# Patient Record
Sex: MALE | Race: WHITE | NOT HISPANIC OR LATINO | Employment: UNEMPLOYED | URBAN - METROPOLITAN AREA
[De-identification: names, ages, dates, MRNs, and addresses within clinical notes are randomized per-mention and may not be internally consistent; named-entity substitution may affect disease eponyms.]

---

## 2019-11-20 ENCOUNTER — MEDICAL CORRESPONDENCE (OUTPATIENT)
Dept: HEALTH INFORMATION MANAGEMENT | Facility: CLINIC | Age: 59
End: 2019-11-20

## 2020-01-10 ENCOUNTER — TRANSFERRED RECORDS (OUTPATIENT)
Dept: HEALTH INFORMATION MANAGEMENT | Facility: CLINIC | Age: 60
End: 2020-01-10

## 2020-01-11 ENCOUNTER — TRANSFERRED RECORDS (OUTPATIENT)
Dept: HEALTH INFORMATION MANAGEMENT | Facility: CLINIC | Age: 60
End: 2020-01-11

## 2020-01-12 ENCOUNTER — TRANSFERRED RECORDS (OUTPATIENT)
Dept: HEALTH INFORMATION MANAGEMENT | Facility: CLINIC | Age: 60
End: 2020-01-12

## 2020-02-28 ENCOUNTER — TRANSFERRED RECORDS (OUTPATIENT)
Dept: HEALTH INFORMATION MANAGEMENT | Facility: CLINIC | Age: 60
End: 2020-02-28

## 2020-03-30 ENCOUNTER — PRE VISIT (OUTPATIENT)
Dept: ONCOLOGY | Facility: CLINIC | Age: 60
End: 2020-03-30

## 2020-03-30 NOTE — TELEPHONE ENCOUNTER
ONCOLOGY INTAKE: Records Information      APPT INFORMATION: [id number] Dr. Tristin Coleman  Referring provider:  Self   Referring provider s clinic:  NA  Reason for visit/diagnosis:  Prostate Cancer  Has patient been notified of appointment date and time?: Yes    RECORDS INFORMATION:  Were the records received with the referral (via [employer])? No    Has patient been seen for any external appt for this diagnosis? Yes    If yes, where? [facility]    Has patient had any imaging or procedures outside of [facility] for this condition? Yes      If Yes, where? [facility]    ADDITIONAL INFORMATION:  None

## 2020-03-30 NOTE — TELEPHONE ENCOUNTER
Reason for Visit: Consult    Diagnosis: Prostate cancer    Orders/Procedures/Records: Records available    Contact Patient: N/A    Rooming Requirements: Check pt in      Carol Burkett  03/30/20  11:29 AM

## 2020-04-06 ENCOUNTER — PRE VISIT (OUTPATIENT)
Dept: ONCOLOGY | Facility: CLINIC | Age: 60
End: 2020-04-06

## 2020-04-06 ENCOUNTER — VIRTUAL VISIT (OUTPATIENT)
Dept: UROLOGY | Facility: CLINIC | Age: 60
End: 2020-04-06
Payer: COMMERCIAL

## 2020-04-06 DIAGNOSIS — C61 PROSTATE CANCER (H): Primary | ICD-10-CM

## 2020-04-06 NOTE — PATIENT INSTRUCTIONS
Please complete a PSA. Dr. Coleman will call you with the results. Please schedule an appointment to see Dr. Coleman in 3 months with a PSA prior to appointment.    It was a pleasure meeting with you today.  Thank you for allowing me and my team the privilege of caring for you today.  YOU are the reason we are here, and I truly hope we provided you with the excellent service you deserve.  Please let us know if there is anything else we can do for you so that we can be sure you are leaving completely satisfied with your care experience.        Cory Vizcaino, EMT

## 2020-04-06 NOTE — PROGRESS NOTES
"Viktor Cordon is a 59 year old male who is being evaluated via a billable video visit.      The patient has been notified of following:     \"This video visit will be conducted via a call between you and your physician/provider. We have found that certain health care needs can be provided without the need for an in-person physical exam.  This service lets us provide the care you need with a video conversation.  If a prescription is necessary we can send it directly to your pharmacy.  If lab work is needed we can place an order for that and you can then stop by our lab to have the test done at a later time.    If during the course of the call the physician/provider feels a video visit is not appropriate, you will not be charged for this service.\"     Patient has given verbal consent for Video visit? Yes    Patient would like the video invitation sent by: Text to cell phone: [phone]    Video Start Time: 1:55 PM          Chief Complaint:   Prostate Cancer         History of Present Illness:    Viktor Cordon is a very pleasant 59 year old male who presents with a history of Prostate Cancer. He underwent RALP with lymph node dissection by Dr. Jose M Barlow MD on 1/10/2020. He has had uncomplicated post-op recovery.   Final pathology from prostatectomy was [place] 4+4=8 prostate cancer, stage pT2N0 with positive margins. He presents today to transfer care as he has relocated to the [facility]. No current issues. He reports good return of continence to date. No return of erectile function yet.         Past Medical History:   Prostate Cancer         Past Surgical History:   RALP 1/10/2020         Medications   Multivitamin         Family History:   No known family history of  malignancy.         Social History:     Social History     Socioeconomic History     Marital status: Single     Spouse name: Not on file     Number of children: Not on file     Years of education: Not on file     Highest education level: Not " on file   Occupational History     Not on file   Social Needs     Financial resource strain: Not on file     Food insecurity     Worry: Not on file     Inability: Not on file     Transportation needs     Medical: Not on file     Non-medical: Not on file   Tobacco Use     Smoking status: Former Smoker     Types: Cigarettes     Smokeless tobacco: Former User     Types: Chew   Substance and Sexual Activity     Alcohol use: Not on file     Drug use: Not on file     Sexual activity: Not on file   Lifestyle     Physical activity     Days per week: Not on file     Minutes per session: Not on file     Stress: Not on file   Relationships     Social connections     Talks on phone: Not on file     Gets together: Not on file     Attends [employer] service: Not on file     Active member of club or organization: Not on file     Attends meetings of clubs or organizations: Not on file     Relationship status: Not on file     Intimate partner violence     Fear of current or ex partner: Not on file     Emotionally abused: Not on file     Physically abused: Not on file     Forced sexual activity: Not on file   Other Topics Concern     Not on file   Social History Narrative     Not on file     Works as          Allergies:   Patient has no known allergies.         Review of Systems:  From intake questionnaire     Skin: negative  Eyes: negative  Ears/Nose/Throat: negative  Respiratory: No shortness of breath, dyspnea on exertion, cough, or hemoptysis  Cardiovascular: No chest pain or palpitations  Gastrointestinal: negative; no nausea/vomiting, constipation or diarrhea  Genitourinary: as per HPI  Musculoskeletal: negative  Neurologic: negative  Psychiatric: negative  Hematologic/Lymphatic/Immunologic: negative  Endocrine: negative         Physical Exam:     Patient is a 59 year old  male   Vitals: There were no vitals taken for this visit.  Constitutional: Alert, no acute distress, oriented, conversant  Eyes: no scleral  icterus; extraocular muscles intact, moist conjunctivae  Respiratory: no respiratory distress, or pursed lip breathing  Neuro: Alert, oriented, speech and mentation normal  Psych: affect and mood normal, alert and oriented to person, place and time      Outside and Past Medical records:    I spent 10 minutes reviewing outside and past medical records.         Assessment and Plan:     Assessment: 59 year old male with stage pT2N0, [place] 4+4=8 prostate cancer, s/p RALP completed 1/10/2020 in [place] with Dr. Barlow. Positive margins. Recovering well. We discussed his pathology in detail today. We discussed the importance of ongoing PSA surveillance and risk of recurrence. We discussed possible rationlae for adjuvant/salvage radiotherapy and that we would discuss this further once we know his post-op PSA values. Will plan to obtain a PSA in the next few weeks and plan for the next one in about 3 months. Will call him with result of this next PSA.     Plan:  - PSA soon - will call with results  - Follow-up 3 months with PSA    Orders  Orders Placed This Encounter   Procedures     PSA tumor marker     PSA tumor marker     Video-Visit Details    Type of service:  Video Visit    Video End Time (time video stopped): 2:08    Originating Location (pt. Location): Home    Distant Location (provider location):  [facility] [place] [facility] AND [facility] FOR PROSTATE AND UROLOGIC CANCERS     Mode of Communication:  [facility] Conference [facility]    Tristin Coleman MD  Urology  [facility]

## 2020-05-06 DIAGNOSIS — C61 PROSTATE CANCER (H): ICD-10-CM

## 2020-05-06 LAB — PSA SERPL-MCNC: 0.59 UG/L (ref 0–4)

## 2020-05-06 PROCEDURE — 36415 COLL VENOUS BLD VENIPUNCTURE: CPT | Performed by: UROLOGY

## 2020-05-06 PROCEDURE — 84153 ASSAY OF PSA TOTAL: CPT | Performed by: UROLOGY

## 2020-05-12 ENCOUNTER — TELEPHONE (OUTPATIENT)
Dept: UROLOGY | Facility: CLINIC | Age: 60
End: 2020-05-12

## 2020-05-12 NOTE — TELEPHONE ENCOUNTER
[facility] Call Center    Phone Message    May a detailed message be left on voicemail: yes     Reason for Call: Requesting Results   Name/type of test: PSA  Date of test: 5/6  Was test done at a location other than [facility] (Please fill in the location if not [facility])?: No  Pt would like results please    Action Taken: Message routed to:  [facility]rology    Travel Screening: Not Applicable

## 2020-05-12 NOTE — TELEPHONE ENCOUNTER
Patient that his PSA is 0.59. Patient will follow up with Dr. Tristin Coleman on 7/27/2020 @ 10:15 am.      Escobar Saini MA

## 2020-05-14 ENCOUNTER — VIRTUAL VISIT (OUTPATIENT)
Dept: UROLOGY | Facility: CLINIC | Age: 60
End: 2020-05-14
Payer: COMMERCIAL

## 2020-05-14 VITALS — BODY MASS INDEX: 25.84 KG/M2 | HEIGHT: 73 IN | WEIGHT: 195 LBS

## 2020-05-14 DIAGNOSIS — C61 PROSTATE CANCER (H): Primary | ICD-10-CM

## 2020-05-14 PROCEDURE — 99213 OFFICE O/P EST LOW 20 MIN: CPT | Mod: 95 | Performed by: UROLOGY

## 2020-05-14 ASSESSMENT — PAIN SCALES - GENERAL: PAINLEVEL: NO PAIN (0)

## 2020-05-14 ASSESSMENT — MIFFLIN-ST. JEOR: SCORE: 1758.39

## 2020-05-14 NOTE — PROGRESS NOTES
"Viktor Cordon is a 60 year old male who is being evaluated via a billable video visit.      The patient has been notified of following:     \"This video visit will be conducted via a call between you and your physician/provider. We have found that certain health care needs can be provided without the need for an in-person physical exam.  This service lets us provide the care you need with a video conversation.  If a prescription is necessary we can send it directly to your pharmacy.  If lab work is needed we can place an order for that and you can then stop by our lab to have the test done at a later time.    Video visits are billed at different rates depending on your insurance coverage.  Please reach out to your insurance provider with any questions.    If during the course of the call the physician/provider feels a video visit is not appropriate, you will not be charged for this service.\"    Patient has given verbal consent for Video visit? Yes    How would you like to obtain your AVS? [facility]    Patient would like the video invitation sent by: Text to cell phone:  [phone]    Will anyone else be joining your video visit? No      CHIEF COMPLAINT   It was my pleasure to see Viktor Cordon who is a 60 year old male for follow-up of Prostate Cancer.      HPI  Viktor Cordon is a very pleasant 60 year old male who presents with a history of Prostate Cancer. He underwent RALP with lymph node dissection by Dr. Jose M Barlow MD on 1/10/2020. He has had uncomplicated post-op recovery.   Final pathology from prostatectomy was [place] 4+4=8 prostate cancer, stage pT2N0 with positive margins. He presents today to transfer care as he has relocated to the [facility]. No current issues. He reports good return of continence. Not wearing pads. No return of erectile function yet.    Here to follow-up PSA. This did not go to undetectable post-op.    PSA  5/6/2020 - 0.59         Allergies:   Patient has no known allergies.     "     Review of Systems:  From intake questionnaire     Skin: negative  Eyes: negative  Ears/Nose/Throat: negative  Respiratory: No shortness of breath, dyspnea on exertion, cough, or hemoptysis  Cardiovascular: No chest pain or palpitations  Gastrointestinal: negative; no nausea/vomiting, constipation or diarrhea  Genitourinary: as per HPI  Musculoskeletal: negative  Neurologic: negative  Psychiatric: negative  Hematologic/Lymphatic/Immunologic: negative  Endocrine: negative         Physical Exam:     Patient is a 60 year old  male   Vitals: There were no vitals taken for this visit.  Constitutional: There is no height or weight on file to calculate BMI.  Alert, no acute distress, oriented, conversant  Eyes: no scleral icterus; extraocular muscles intact, moist conjunctivae  Respiratory: no respiratory distress, or pursed lip breathing  Neuro: Alert, oriented, speech and mentation normal  Psych: affect and mood normal, alert and oriented to person, place and time         Assessment and Plan:     Assessment: 59 year old male with stage pT2N0, [place] 4+4=8 prostate cancer, s/p RALP completed 1/10/2020 in [place] with Dr. Barlow. Positive margins. He has good return of continence. No erectile function yet. PSA did not go to undetectable. We reviewed the significance of this with regard to disease recurrence. We discussed possible rationale for salvage radiotherapy. Given his persistently elevated post-op PSA, I recommend referral to radiation oncology to discuss this further. We will arrange for this in the near future and otherwise plan for follow-up with me as scheduled in 2-3 months.     Plan:  - Radiation oncology referral  - Follow-up 2-3 months with PSA, as scheduled    Orders  Orders Placed This Encounter   Procedures     RADIATION THERAPY REFERRAL     Video-Visit Details    Type of service:  Video Visit    Video Start Time: 3:27 PM  Video End Time: 3:48 PM    Originating Location (pt. Location): Home    Distant  Location (provider location):  [facility] [place]     Platform used for Video Visit: Delmis Coleman MD  Urology  [facility]

## 2020-05-14 NOTE — NURSING NOTE
Chief Complaint   Patient presents with     Hx Prostate Cancer     Review PSA results     Jo Phelps, EMT

## 2020-05-25 ENCOUNTER — DOCUMENTATION ONLY (OUTPATIENT)
Dept: CARE COORDINATION | Facility: CLINIC | Age: 60
End: 2020-05-25

## 2020-07-06 ENCOUNTER — TELEPHONE (OUTPATIENT)
Dept: UROLOGY | Facility: CLINIC | Age: 60
End: 2020-07-06

## 2020-07-06 NOTE — TELEPHONE ENCOUNTER
Junior Aguilar,      Patient states that his PSA is 0.5. Patient was notified that this is normal range. He wants to know why it's not 0 since he had prostate surgery. He will have [facility] forward his PSA result. Patient will like a call to discuss this result.      ThankEscobar MA

## 2020-07-06 NOTE — TELEPHONE ENCOUNTER
[facility] Call Center    Phone Message    May a detailed message be left on voicemail: yes     Reason for Call: Other: Pt called and said that he took a second PSA a week and a half ago and just got the results today.  Pt said that it's .05.  Pt would like for someone to give him a call back to discuss this. Thanks     Action Taken: Message routed to:  [facility] URO    Travel Screening: Not Applicable
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
## 2020-07-07 NOTE — TELEPHONE ENCOUNTER
He knew he did not go to non-detectable after surgery and was referred to rad onc? Did he go see Dr. Morrison or any other radiologist?     JUANYT

## 2020-07-16 ENCOUNTER — TELEPHONE (OUTPATIENT)
Dept: UROLOGY | Facility: CLINIC | Age: 60
End: 2020-07-16

## 2020-07-16 NOTE — TELEPHONE ENCOUNTER
Left a detailed VM for patient to call the clinic and move appt to 8/27 in person or 9/3 for virtual appointment. Due to Covid-19

## 2020-07-16 NOTE — TELEPHONE ENCOUNTER
----- Message from JOANN Duke sent at 7/16/2020  3:09 PM CDT -----  Regarding: Convert to VV  Hello,    Could you please convert this patient's appointment to a virtual visit? Patient is just monitoring their PSA and therefore will not need to have an appointment in clinic. They may need to be moved to another day as Dr. Coleman's VV schedule is full on 7/27 but there should be openings in early August.     Thanks!  Cory

## 2020-07-21 ENCOUNTER — PRE VISIT (OUTPATIENT)
Dept: UROLOGY | Facility: CLINIC | Age: 60
End: 2020-07-21

## 2020-07-21 NOTE — TELEPHONE ENCOUNTER
Visit Type : PSA check     Records/Orders: PSA    Pt Contacted: called patient to please get PSA done 1 week before appointment     At Rooming: normal

## 2023-11-22 ENCOUNTER — HOSPITAL ENCOUNTER (EMERGENCY)
Facility: HOSPITAL | Age: 63
Discharge: HOME | End: 2023-11-22
Attending: EMERGENCY MEDICINE | Admitting: EMERGENCY MEDICINE
Payer: COMMERCIAL

## 2023-11-22 VITALS
RESPIRATION RATE: 17 BRPM | BODY MASS INDEX: 26.56 KG/M2 | WEIGHT: 200.4 LBS | HEIGHT: 73 IN | DIASTOLIC BLOOD PRESSURE: 78 MMHG | SYSTOLIC BLOOD PRESSURE: 133 MMHG | OXYGEN SATURATION: 99 % | HEART RATE: 91 BPM | TEMPERATURE: 98.3 F

## 2023-11-22 DIAGNOSIS — M10.9 ACUTE GOUT OF RIGHT KNEE, UNSPECIFIED CAUSE: ICD-10-CM

## 2023-11-22 LAB
APPEARANCE FLD: ABNORMAL
CELL COUNT BODY FLUID SOURCE: ABNORMAL
COLOR FLD: YELLOW
CRYSTALS SNV MICRO: ABNORMAL
LYMPHOCYTES NFR FLD MANUAL: NORMAL %
MONOS+MACROS NFR FLD MANUAL: 20 %
NEUTS BAND NFR FLD MANUAL: 80 %
RBC # FLD: 6 /UL
WBC # FLD AUTO: ABNORMAL /UL

## 2023-11-22 PROCEDURE — 99284 EMERGENCY DEPT VISIT MOD MDM: CPT | Mod: 25

## 2023-11-22 PROCEDURE — 87070 CULTURE OTHR SPECIMN AEROBIC: CPT | Performed by: EMERGENCY MEDICINE

## 2023-11-22 PROCEDURE — 250N000013 HC RX MED GY IP 250 OP 250 PS 637: Performed by: EMERGENCY MEDICINE

## 2023-11-22 PROCEDURE — 89060 EXAM SYNOVIAL FLUID CRYSTALS: CPT | Performed by: EMERGENCY MEDICINE

## 2023-11-22 PROCEDURE — 20606 DRAIN/INJ JOINT/BURSA W/US: CPT | Mod: RT

## 2023-11-22 PROCEDURE — 89050 BODY FLUID CELL COUNT: CPT | Performed by: EMERGENCY MEDICINE

## 2023-11-22 PROCEDURE — 87075 CULTR BACTERIA EXCEPT BLOOD: CPT | Performed by: EMERGENCY MEDICINE

## 2023-11-22 RX ORDER — COLCHICINE 0.6 MG/1
0.6 TABLET ORAL DAILY
Qty: 6 TABLET | Refills: 0 | Status: SHIPPED | OUTPATIENT
Start: 2023-11-22

## 2023-11-22 RX ORDER — IBUPROFEN 600 MG/1
600 TABLET, FILM COATED ORAL ONCE
Status: COMPLETED | OUTPATIENT
Start: 2023-11-22 | End: 2023-11-22

## 2023-11-22 RX ORDER — IBUPROFEN 600 MG/1
600 TABLET, FILM COATED ORAL EVERY 8 HOURS PRN
Qty: 30 TABLET | Refills: 0 | Status: SHIPPED | OUTPATIENT
Start: 2023-11-22

## 2023-11-22 RX ORDER — HYDROCODONE BITARTRATE AND ACETAMINOPHEN 5; 325 MG/1; MG/1
1 TABLET ORAL EVERY 6 HOURS PRN
Qty: 10 TABLET | Refills: 0 | Status: SHIPPED | OUTPATIENT
Start: 2023-11-22 | End: 2023-11-25

## 2023-11-22 RX ORDER — PREDNISONE 10 MG/1
TABLET ORAL
Qty: 32 TABLET | Refills: 0 | Status: SHIPPED | OUTPATIENT
Start: 2023-11-22 | End: 2023-12-02

## 2023-11-22 RX ADMIN — IBUPROFEN 600 MG: 600 TABLET, FILM COATED ORAL at 11:59

## 2023-11-22 ASSESSMENT — ACTIVITIES OF DAILY LIVING (ADL)
ADLS_ACUITY_SCORE: 35
ADLS_ACUITY_SCORE: 35
ADLS_ACUITY_SCORE: 33

## 2023-11-22 ASSESSMENT — ENCOUNTER SYMPTOMS
CHILLS: 0
ARTHRALGIAS: 1
FEVER: 0
JOINT SWELLING: 1

## 2023-11-22 NOTE — ED PROVIDER NOTES
EMERGENCY DEPARTMENT ENCOUNTER      NAME: Viktor Cordon  AGE: 63 year old male  YOB: 1960  MRN: [id number]  EVALUATION DATE & TIME: No admission date for patient encounter.    PCP: No Ref-Primary, Physician    ED PROVIDER: Teo Hall MD      Chief Complaint   Patient presents with    Knee Pain         FINAL IMPRESSION:  Gout  Right knee arthrocentesis    ED COURSE & MEDICAL DECISION MAKING:    Pertinent Labs & Imaging studies reviewed. (See chart for details)  63 year old male presents to the Emergency Department for evaluation of right knee pain.  Patient reports having a typical vague achiness along the joint line.  Over the last 2 days the pain worsened significantly and he had marked swelling in the knee.  Seen in outpatient clinic where x-rays were obtained.  This revealed a small bony body in the lateral knee.  Patient reports severe sharp discomfort especially with bending.  Denies any overexertion's or injuries.  Reports having similar swelling and discomfort little over a month ago but symptoms resolved without intervention    10:55 AM I introduced myself to the patient, obtained patient history, performed a physical exam, and discussed plan for ED workup including potential diagnostic laboratory/imaging studies and interventions.  11:35 AM Arthrocentesis procedure. See procedures section of this note for detailed documentation of this procedure.  Fluid extremely viscous and cloudy.  Patient reports improvement after procedure  1:23 PM.  Situation discussed with laboratory.  Analysis is done at the [facility].  [facility]  Situation explained to patient   2:28 PM.  Analysis consistent with urate crystals  3:20 PM.  White cell count moderately elevated 57,000.  In setting of urate crystals findings consistent with gout.  Will proceed with outpatient colchicine, prednisone and hydrocodone.  Patient to follow this up with ibuprofen.  Expectation is for  abscess improvement over the next 3 to 5 days.      At the conclusion of the encounter I discussed the results of all of the tests and the disposition. The questions were answered. The patient or family acknowledged understanding and was agreeable with the care plan.     Medical Decision Making    History:  Supplemental history from: N/A  External Record(s) reviewed: Prior Imaging: X-ray right knee 11/22/23    Work Up:  Chart documentation includes differential considered and any EKGs or imaging independently interpreted by provider.  In additional to work up documented, I considered the following work up: Documented in chart, if applicable.    External consultation:  Discussion of management with another provider: Documented in chart, if applicable    Complicating factors:  Care impacted by chronic illness: Hyperlipidemia and Hypertension  Care affected by social determinants of health: N/A    Disposition considerations: Discharge. I prescribed additional prescription strength medication(s) as charted. I considered admission, but discharged the patient after share decision making conversation.      0 minutes of critical care time     MEDICATIONS GIVEN IN THE EMERGENCY:  Medications - No data to display    NEW PRESCRIPTIONS STARTED AT TODAY'S ER VISIT  There are no discharge medications for this patient.  Current Discharge Medication List        START taking these medications    Details   colchicine (COLCRYS) 0.6 MG tablet Take 1 tablet (0.6 mg) by mouth daily  Qty: 6 tablet, Refills: 0      HYDROcodone-acetaminophen (NORCO) 5-325 MG tablet Take 1 tablet by mouth every 6 hours as needed  Qty: 10 tablet, Refills: 0      ibuprofen (ADVIL/MOTRIN) 600 MG tablet Take 1 tablet (600 mg) by mouth every 8 hours as needed for moderate pain  Qty: 30 tablet, Refills: 0    Comments: Start after you complete the prednisone      predniSONE (DELTASONE) 10 MG tablet Take 4 tablets daily for 5 days,  take 2 tablets daily for 3 days, take 1  tablet daily for 3 days, take half a tablet for 3 days.  Qty: 32 tablet, Refills: 0                =================================================================    HPI    Patient information was obtained from: Patient    Use of : N/A      Viktor Cordon is a 63 year old male with a pertinent history of HTN and HLD who presents to this ED by walk in for evaluation of right knee pain.    The patient reports he woke up yesterday morning around 0130 with right knee pain. The pain progressively worsened throughout the day and last night his right knee began to swell. He had increased swelling throughout the night. This morning he had an x-ray of the knees (result below) and was referred to the ED for evaluation. He has not had any fevers or chills. He regularly walks and bikes, but has not had any participated in any new activities or had any trauma to his knee.    The patient reports he had a similar pain in his right knee about a month ago that resolved after a few days and taking ibuprofen. He did not have any swelling of the knee at that time.    Chart Review  X-ray Bilateral Knee 11/22/23  IMPRESSION:  RIGHT KNEE: No acute fracture or dislocation. Small loose body at the lateral joint space. Moderate size joint effusion.  LEFT KNEE: Negative.      REVIEW OF SYSTEMS   Review of Systems   Constitutional:  Negative for chills and fever.   Musculoskeletal:  Positive for arthralgias (right knee) and joint swelling (right knee).       PAST MEDICAL HISTORY:  Past Medical History:   Diagnosis Date    Blood in semen     Dyslipidemia     Elevated triglycerides with high cholesterol     Mumps     Prostate cancer (H)        PAST SURGICAL HISTORY:  Past Surgical History:   Procedure Laterality Date    APPENDECTOMY      PROSTATE SURGERY  01/2020    RALP with lymph node dissection by Dr. Jose M Barlow MD on 1/10/2020           CURRENT MEDICATIONS:    No current outpatient medications on file.      ALLERGIES:  No  "Known Allergies    FAMILY HISTORY:  Family History   Problem Relation Age of Onset    Skin Cancer Mother        SOCIAL HISTORY:   Social History     Socioeconomic History    Marital status: Single       VITALS:  BP (!) 151/82 (BP Location: Left arm, Cuff Size: Adult Regular)   Pulse 100   Temp 98.3  F (36.8  C) (Oral)   Resp 17   Ht 1.854 m (6' 1\")   Wt 90.9 kg (200 lb 6.4 oz)   SpO2 98%   BMI 26.44 kg/m      PHYSICAL EXAM    VITAL SIGNS: BP (!) 151/82 (BP Location: Left arm, Cuff Size: Adult Regular)   Pulse 100   Temp 98.3  F (36.8  C) (Oral)   Resp 17   Ht 1.854 m (6' 1\")   Wt 90.9 kg (200 lb 6.4 oz)   SpO2 98%   BMI 26.44 kg/m      Constitutional:  Awake, alert, in mild distress  HENT:  Normocephalic, Atraumatic. Bilateral external ears normal. Oropharynx moist. Nose normal. Neck- Normal range of motion with no guarding, No midline cervical tenderness, Supple, No stridor.   Eyes:  PERRL, EOMI with no signs of entrapment, Conjunctiva normal, No discharge.   Respiratory:  Normal breath sounds, No respiratory distress, No wheezing.    Cardiovascular:  Normal heart rate, Normal rhythm, No appreciable rubs or gallops.   GI:  Soft, No tenderness, No distension, No palpable masses  Musculoskeletal:  Intact distal pulses. Good range of motion in all major joints. No major deformities noted. Fairly large effusion to the right knee with diffuse tenderness.  Integument:  Warm, Dry, No erythema, No rash.   Neurologic:  Alert & oriented, Normal motor function, Normal sensory function, No focal deficits noted.   Psychiatric:  Affect normal, Judgment normal, Mood normal.      LAB:  All pertinent labs reviewed and interpreted.  Results for orders placed or performed during the hospital encounter of 11/22/23   Crystal ID Synovial Fluid     Status: Abnormal   Result Value Ref Range    Crystals Analysis (A) No clinically significant crystals seen.     Positive for extracellular crystals, consistent with monosodium " urate crystals.   Cell Count Body Fluid     Status: Abnormal   Result Value Ref Range    Color Yellow Colorless, Yellow    Clarity Cloudy (A) Clear    RBC Count 6 /uL    Cell Count Fluid Source Knee, Right     Total Nucleated Cells 57,720 /uL    Narrative    No reference ranges have been established.  This result  should be interpreted in the context of the patient's clinical condition and   compared to simultaneous measurement in the patient's blood.         Differential Body Fluid     Status: None   Result Value Ref Range    % Neutrophils 80 %    % Lymphocytes      % Monocyte/Macrophages 20 %    Narrative    No reference ranges have been established. This result should be interpreted in the context of the patient's clinical condition and compared to simultaneous measurement in the patient's blood.   Cell count with differential fluid     Status: Abnormal    Narrative    The following orders were created for panel order Cell count with differential fluid.  Procedure                               Abnormality         Status                     ---------                               -----------         ------                     Cell Count Body Fluid[[id number]]        Abnormal            Final result               Differential Body Fluid[[id number]]                          Final result                 Please view results for these tests on the individual orders.        RADIOLOGY:  Reviewed all pertinent imaging. Please see official radiology report.  No orders to display       PROCEDURES:     PROCEDURE: Arthrocentesis   INDICATIONS:  Right knee effusion   PROCEDURE PROVIDER: Dr Teo Hall   CONSENT: Risks, benefits and alternatives were discussed with and Written consent was obtained from Patient.   TIME OUT: Universal protocol was followed. TIME OUT conducted just prior to starting procedure confirmed patient identity, site/side, procedure, patient position, and availability of correct equipment. Yes   SITE: Right  knee   MEDICATIONS:  5 mLs of 1% Lidocaine with epinephrine   NOTE: The area was prepped with chlorhexidine and draped off in the usual sterile fashion.  The joint was entered with an 16 gauge needle and 30 ml of fluid was withdrawn. The fluid was  ashely, cloudy, thick . The needle was then withdrawn and a dressing was applied.   COMPLICATIONS: Patient tolerated procedure well, without complication       I, Arun Guy, am serving as a scribe to document services personally performed by Teo Hall MD based on my observation and the provider's statements to me. I, Teo Hall MD, attest that Arun Guy is acting in a scribe capacity, has observed my performance of the services and has documented them in accordance with my direction.    Teo Hall MD  [facility] EMERGENCY DEPARTMENT  [address] [place] [id number]  [phone]     Teo Hall MD  11/22/23 9792

## 2023-11-22 NOTE — ED TRIAGE NOTES
Pt was seen at  clinic and knee xray done and sent here for further management.  Xray results attached to paper chart.  Knee pain Started Tuesday while lying on bed felt a pop on his right knee.  Pain is unbearable and hurts more with lying on bed.  Noted swelling on top of right knee patella and has a knee support in place.  Denies any other injury     Triage Assessment (Adult)       Row Name 11/22/23 1035          Triage Assessment    Airway WDL WDL        Respiratory WDL    Respiratory WDL WDL        Skin Circulation/Temperature WDL    Skin Circulation/Temperature WDL WDL        Cardiac WDL    Cardiac WDL WDL        Peripheral/Neurovascular WDL    Peripheral Neurovascular WDL WDL        Cognitive/Neuro/Behavioral WDL    Cognitive/Neuro/Behavioral WDL WDL

## 2023-11-23 NOTE — RESULT ENCOUNTER NOTE
[facility] Emergency Dept discharge antibiotic prescribed: No  Incision and Drainage performed in [facility] Emergency Dept [Yes or No]: no - arthrocentesis  Recommendations in treatment per [facility] ED Lab Result culture protocol

## 2023-11-27 LAB — BACTERIA SNV CULT: NO GROWTH

## 2023-12-06 LAB — BACTERIA SNV CULT: NORMAL
